# Patient Record
Sex: FEMALE | Race: BLACK OR AFRICAN AMERICAN | NOT HISPANIC OR LATINO | URBAN - METROPOLITAN AREA
[De-identification: names, ages, dates, MRNs, and addresses within clinical notes are randomized per-mention and may not be internally consistent; named-entity substitution may affect disease eponyms.]

---

## 2017-05-20 ENCOUNTER — EMERGENCY (EMERGENCY)
Facility: HOSPITAL | Age: 47
LOS: 1 days | Discharge: HOME | End: 2017-05-20
Attending: EMERGENCY MEDICINE | Admitting: EMERGENCY MEDICINE
Payer: COMMERCIAL

## 2017-05-20 VITALS
DIASTOLIC BLOOD PRESSURE: 82 MMHG | SYSTOLIC BLOOD PRESSURE: 150 MMHG | WEIGHT: 154.98 LBS | RESPIRATION RATE: 16 BRPM | HEART RATE: 83 BPM | TEMPERATURE: 99 F | HEIGHT: 63 IN | OXYGEN SATURATION: 100 %

## 2017-05-20 DIAGNOSIS — Z88.8 ALLERGY STATUS TO OTHER DRUGS, MEDICAMENTS AND BIOLOGICAL SUBSTANCES STATUS: ICD-10-CM

## 2017-05-20 DIAGNOSIS — R10.30 LOWER ABDOMINAL PAIN, UNSPECIFIED: ICD-10-CM

## 2017-05-20 DIAGNOSIS — Z79.899 OTHER LONG TERM (CURRENT) DRUG THERAPY: ICD-10-CM
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
## 2017-05-20 DIAGNOSIS — S39.011A STRAIN OF MUSCLE, FASCIA AND TENDON OF ABDOMEN, INITIAL ENCOUNTER: ICD-10-CM
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
## 2017-05-20 PROCEDURE — 99283 EMERGENCY DEPT VISIT LOW MDM: CPT

## 2017-05-20 RX ORDER — DICLOFENAC SODIUM 75 MG/1
1 TABLET, DELAYED RELEASE ORAL
Qty: 20 | Refills: 0 | OUTPATIENT
Start: 2017-05-20 | End: 2017-05-30

## 2017-05-20 RX ORDER — DICLOFENAC SODIUM 75 MG/1
50 TABLET, DELAYED RELEASE ORAL ONCE
Qty: 0 | Refills: 0 | Status: COMPLETED | OUTPATIENT
Start: 2017-05-20 | End: 2017-05-20

## 2017-05-20 RX ADMIN — DICLOFENAC SODIUM 50 MILLIGRAM(S): 75 TABLET, DELAYED RELEASE ORAL at 14:25

## 2017-05-20 NOTE — ED ADULT NURSE NOTE - OBJECTIVE STATEMENT
Pt with left groin pain radiating to left lower back and down to left knee, pt has had work up, x rays and rx medications but is here at [facility] due to no pain relief with medications given. pt uses crutch to get around, pt has partial weight bearing to LLE, limited ROM due to pain, denies numbness/tingling, cap refill < 2seconds, no swelling noted. pt denies chest pain, SOB.

## 2017-05-20 NOTE — ED PROVIDER NOTE - MEDICAL DECISION MAKING DETAILS
healthy 47 yo female with groin muscle strain about 1 month ago, not getting better.  pt unable to get in with ortho.  will get ortho referral, try different nsaids, pt may also benefit from pt

## 2017-05-20 NOTE — ED ADULT TRIAGE NOTE - CHIEF COMPLAINT QUOTE
"I have pain to my groin from an injury from over a month ago and I was not able to f/u and I have a lot of pain".

## 2019-03-12 ENCOUNTER — APPOINTMENT (OUTPATIENT)
Dept: PULMONOLOGY | Facility: CLINIC | Age: 49
End: 2019-03-12

## 2020-02-14 ENCOUNTER — APPOINTMENT (OUTPATIENT)
Dept: PULMONOLOGY | Facility: CLINIC | Age: 50
End: 2020-02-14
Payer: MEDICAID

## 2020-03-03 ENCOUNTER — APPOINTMENT (OUTPATIENT)
Dept: PULMONOLOGY | Facility: CLINIC | Age: 50
End: 2020-03-03
Payer: MEDICAID

## 2020-03-03 ENCOUNTER — NON-APPOINTMENT (OUTPATIENT)
Age: 50
End: 2020-03-03

## 2020-03-03 VITALS
OXYGEN SATURATION: 100 % | TEMPERATURE: 98.1 F | HEIGHT: 63 IN | RESPIRATION RATE: 14 BRPM | SYSTOLIC BLOOD PRESSURE: 158 MMHG | WEIGHT: 134 LBS | DIASTOLIC BLOOD PRESSURE: 90 MMHG | BODY MASS INDEX: 23.74 KG/M2 | HEART RATE: 67 BPM

## 2020-03-03 DIAGNOSIS — Z82.49 FAMILY HISTORY OF ISCHEMIC HEART DISEASE AND OTHER DISEASES OF THE CIRCULATORY SYSTEM: ICD-10-CM

## 2020-03-03 DIAGNOSIS — Z78.9 OTHER SPECIFIED HEALTH STATUS: ICD-10-CM

## 2020-03-03 DIAGNOSIS — R06.02 SHORTNESS OF BREATH: ICD-10-CM

## 2020-03-03 DIAGNOSIS — J45.909 UNSPECIFIED ASTHMA, UNCOMPLICATED: ICD-10-CM

## 2020-03-03 PROCEDURE — 94060 EVALUATION OF WHEEZING: CPT

## 2020-03-03 PROCEDURE — 99214 OFFICE O/P EST MOD 30 MIN: CPT | Mod: 25

## 2020-03-03 NOTE — PHYSICAL EXAM
[No Acute Distress] : no acute distress [Normal Oropharynx] : normal oropharynx [Normal Appearance] : normal appearance [Normal Rate/Rhythm] : normal rate/rhythm [No Neck Mass] : no neck mass [Normal S1, S2] : normal s1, s2 [No Murmurs] : no murmurs [No Resp Distress] : no resp distress [Clear to Auscultation Bilaterally] : clear to auscultation bilaterally [No Abnormalities] : no abnormalities [No Clubbing] : no clubbing [Normal Gait] : normal gait [Benign] : benign [No Edema] : no edema [No Cyanosis] : no cyanosis [Normal Color/ Pigmentation] : normal color/ pigmentation [FROM] : FROM [No Focal Deficits] : no focal deficits [Normal Affect] : normal affect [Oriented x3] : oriented x3

## 2020-03-03 NOTE — HISTORY OF PRESENT ILLNESS
[Never] : never [TextBox_4] : Patient is a 49-year-old female with past medical history significant for asthma status post SVT who was recently discharged from the hospital and presents followup. The patient is currently being evaluated for ablation. She denies fevers chills chest pain weight loss or hemoptysis

## 2020-03-03 NOTE — REASON FOR VISIT
[Asthma] : asthma [Cough] : cough [Follow-Up - From Hospitalization] : a follow-up visit after a recent hospitalization [Shortness of Breath] : shortness of breath [Wheezing] : wheezing

## 2020-03-03 NOTE — ASSESSMENT
[FreeTextEntry1] : In summary the patient is a 49-year-old female with past medical history significant for supraventricular tachycardia and asthma who presents for followup. The patient's physical exam is within normal limits. Spirometry revealed normal lung volumes. Patient is instructed to continue current medications and followup in 3 months

## 2021-08-02 NOTE — ED ADULT TRIAGE NOTE - SPO2 (%)
Nutrition Services    Patient Name: Ro Nathan  YOB: 1946  MRN: [id number]  Admission date: 7/27/2021    PPE Documentation        PPE Worn By Provider Did not enter room on this encounter    PPE Worn By Patient  None      PROGRESS NOTE      Encounter Information: Progress note to monitor for diet advancement        PO Diet: Diet Diabetic/Consistent Carbs; Diabetic - Consistent Carb   PO Supplements: Appears Boost Plus was Dc'd    PO Intake:  % of meals- patient is ordering full meals        Nutrition support orders: -    Nutrition support review: -       Labs (reviewed below): Reviewed         GI Function:  + BM 7/31        Nutrition Intervention: Will continue to monitor PO intakes for adequacy        Results from last 7 days   Lab Units 08/02/21  0338 08/01/21  0340 07/31/21  0245   SODIUM mmol/L 133* 135* 134*   POTASSIUM mmol/L 4.4 4.2 4.2   CHLORIDE mmol/L 101 101 99   CO2 mmol/L 26.0 25.0 25.0   BUN mg/dL 8 10 12   CREATININE mg/dL 0.84 0.89 0.95   CALCIUM mg/dL 8.4* 8.6 8.4*   BILIRUBIN mg/dL 0.5 0.6 0.4   ALK PHOS U/L 243* 277* 297*   ALT (SGPT) U/L 13 18 23   AST (SGOT) U/L 12 19 20   GLUCOSE mg/dL 146* 135* 139*     Results from last 7 days   Lab Units 08/02/21  0338 07/28/21  0536 07/27/21  1236   MAGNESIUM mg/dL  --   --  1.8   HEMOGLOBIN g/dL 10.3*   < > 11.7*   HEMATOCRIT % 30.2*   < > 35.0*    < > = values in this interval not displayed.     COVID19   Date Value Ref Range Status   07/28/2021 Not Detected Not Detected - Ref. Range Final     Lab Results   Component Value Date    HGBA1C 9.0 (H) 07/28/2021       RD to follow up per protocol.    Electronically signed by:  Elza Wilson RD  08/02/21 15:20 EDT   100